# Patient Record
Sex: FEMALE | Race: WHITE | Employment: OTHER | RURAL
[De-identification: names, ages, dates, MRNs, and addresses within clinical notes are randomized per-mention and may not be internally consistent; named-entity substitution may affect disease eponyms.]

---

## 2017-12-21 ENCOUNTER — HOSPITAL ENCOUNTER (OUTPATIENT)
Dept: OTHER | Age: 42
Discharge: HOME | End: 2017-12-21

## 2017-12-21 DIAGNOSIS — R51.9 CHRONIC NONINTRACTABLE HEADACHE, UNSPECIFIED HEADACHE TYPE: ICD-10-CM

## 2017-12-21 DIAGNOSIS — G89.29 CHRONIC NONINTRACTABLE HEADACHE, UNSPECIFIED HEADACHE TYPE: ICD-10-CM

## 2018-07-16 ENCOUNTER — HOSPITAL ENCOUNTER (OUTPATIENT)
Facility: HOSPITAL | Age: 43
Discharge: HOME | End: 2018-07-16
Payer: MEDICARE

## 2018-07-16 LAB
AMPHETAMINE SCREEN, URINE: NORMAL
BARBITURATE SCREEN URINE: NORMAL
BENZODIAZEPINE SCREEN, URINE: NORMAL
CANNABINOID SCREEN URINE: NORMAL
COCAINE METABOLITE SCREEN URINE: NORMAL
Lab: NORMAL
METHADONE SCREEN, URINE: NORMAL
METHAMPHETAMINE, URINE: NORMAL
OPIATE SCREEN URINE: NORMAL
PHENCYCLIDINE SCREEN URINE: NORMAL
PROPOXYPHENE SCREEN, URINE: NORMAL
TRICYCLIC, URINE: NORMAL
UR OXYCODONE RAPID SCREEN: NORMAL

## 2018-07-16 PROCEDURE — 80307 DRUG TEST PRSMV CHEM ANLYZR: CPT

## 2018-07-19 LAB — MISCELLANEOUS LAB TEST ORDER: NORMAL

## 2019-02-21 ENCOUNTER — OFFICE VISIT (OUTPATIENT)
Dept: PRIMARY CARE CLINIC | Age: 44
End: 2019-02-21
Payer: MEDICARE

## 2019-02-21 VITALS
OXYGEN SATURATION: 95 % | HEART RATE: 104 BPM | BODY MASS INDEX: 23.74 KG/M2 | HEIGHT: 63 IN | SYSTOLIC BLOOD PRESSURE: 110 MMHG | WEIGHT: 134 LBS | DIASTOLIC BLOOD PRESSURE: 80 MMHG

## 2019-02-21 DIAGNOSIS — G89.29 CHRONIC PAIN OF RIGHT KNEE: Primary | ICD-10-CM

## 2019-02-21 DIAGNOSIS — G62.89 AXONAL NEUROPATHY: ICD-10-CM

## 2019-02-21 DIAGNOSIS — G89.29 CHRONIC LOW BACK PAIN WITHOUT SCIATICA, UNSPECIFIED BACK PAIN LATERALITY: ICD-10-CM

## 2019-02-21 DIAGNOSIS — J30.9 ALLERGIC RHINITIS, UNSPECIFIED SEASONALITY, UNSPECIFIED TRIGGER: ICD-10-CM

## 2019-02-21 DIAGNOSIS — M54.50 CHRONIC LOW BACK PAIN WITHOUT SCIATICA, UNSPECIFIED BACK PAIN LATERALITY: ICD-10-CM

## 2019-02-21 DIAGNOSIS — M25.561 CHRONIC PAIN OF RIGHT KNEE: Primary | ICD-10-CM

## 2019-02-21 DIAGNOSIS — G56.00 CARPAL TUNNEL SYNDROME, UNSPECIFIED LATERALITY: ICD-10-CM

## 2019-02-21 DIAGNOSIS — M79.7 FIBROMYALGIA: ICD-10-CM

## 2019-02-21 DIAGNOSIS — K21.9 GASTROESOPHAGEAL REFLUX DISEASE, ESOPHAGITIS PRESENCE NOT SPECIFIED: ICD-10-CM

## 2019-02-21 PROCEDURE — 99214 OFFICE O/P EST MOD 30 MIN: CPT | Performed by: NURSE PRACTITIONER

## 2019-02-21 PROCEDURE — 1036F TOBACCO NON-USER: CPT | Performed by: NURSE PRACTITIONER

## 2019-02-21 PROCEDURE — G8420 CALC BMI NORM PARAMETERS: HCPCS | Performed by: NURSE PRACTITIONER

## 2019-02-21 PROCEDURE — G8484 FLU IMMUNIZE NO ADMIN: HCPCS | Performed by: NURSE PRACTITIONER

## 2019-02-21 PROCEDURE — G8427 DOCREV CUR MEDS BY ELIG CLIN: HCPCS | Performed by: NURSE PRACTITIONER

## 2019-02-21 RX ORDER — OMEPRAZOLE 20 MG/1
20 CAPSULE, DELAYED RELEASE ORAL DAILY
Qty: 30 CAPSULE | Refills: 5 | Status: SHIPPED | OUTPATIENT
Start: 2019-02-21 | End: 2020-08-27

## 2019-02-21 RX ORDER — FLUTICASONE PROPIONATE 50 MCG
2 SPRAY, SUSPENSION (ML) NASAL DAILY
Qty: 1 BOTTLE | Refills: 5 | Status: SHIPPED | OUTPATIENT
Start: 2019-02-21 | End: 2021-07-12

## 2019-02-21 ASSESSMENT — ENCOUNTER SYMPTOMS
EYE PAIN: 0
SORE THROAT: 0
SHORTNESS OF BREATH: 0
VOMITING: 0
ABDOMINAL PAIN: 0
COUGH: 0
NAUSEA: 0

## 2019-02-21 ASSESSMENT — PATIENT HEALTH QUESTIONNAIRE - PHQ9
2. FEELING DOWN, DEPRESSED OR HOPELESS: 0
1. LITTLE INTEREST OR PLEASURE IN DOING THINGS: 0
SUM OF ALL RESPONSES TO PHQ9 QUESTIONS 1 & 2: 0
SUM OF ALL RESPONSES TO PHQ QUESTIONS 1-9: 0
SUM OF ALL RESPONSES TO PHQ QUESTIONS 1-9: 0

## 2019-02-24 ASSESSMENT — ENCOUNTER SYMPTOMS: BACK PAIN: 1

## 2019-04-05 ENCOUNTER — OFFICE VISIT (OUTPATIENT)
Dept: PRIMARY CARE CLINIC | Age: 44
End: 2019-04-05
Payer: MEDICARE

## 2019-04-05 VITALS
OXYGEN SATURATION: 100 % | SYSTOLIC BLOOD PRESSURE: 137 MMHG | DIASTOLIC BLOOD PRESSURE: 87 MMHG | WEIGHT: 134 LBS | TEMPERATURE: 97.5 F | HEART RATE: 87 BPM | BODY MASS INDEX: 23.74 KG/M2

## 2019-04-05 DIAGNOSIS — J01.10 ACUTE NON-RECURRENT FRONTAL SINUSITIS: Primary | ICD-10-CM

## 2019-04-05 PROCEDURE — 99213 OFFICE O/P EST LOW 20 MIN: CPT | Performed by: NURSE PRACTITIONER

## 2019-04-05 PROCEDURE — G8420 CALC BMI NORM PARAMETERS: HCPCS | Performed by: NURSE PRACTITIONER

## 2019-04-05 PROCEDURE — 1036F TOBACCO NON-USER: CPT | Performed by: NURSE PRACTITIONER

## 2019-04-05 PROCEDURE — G8427 DOCREV CUR MEDS BY ELIG CLIN: HCPCS | Performed by: NURSE PRACTITIONER

## 2019-04-05 RX ORDER — PREDNISONE 20 MG/1
20 TABLET ORAL DAILY
Qty: 10 TABLET | Refills: 0 | Status: SHIPPED | OUTPATIENT
Start: 2019-04-05 | End: 2019-04-10

## 2019-04-05 RX ORDER — DOXYCYCLINE HYCLATE 100 MG
100 TABLET ORAL 2 TIMES DAILY
Qty: 20 TABLET | Refills: 0 | Status: SHIPPED | OUTPATIENT
Start: 2019-04-05 | End: 2019-04-15

## 2019-04-05 ASSESSMENT — ENCOUNTER SYMPTOMS
EYES NEGATIVE: 1
WHEEZING: 1
COUGH: 1
GASTROINTESTINAL NEGATIVE: 1
SINUS PRESSURE: 1
SINUS PAIN: 1
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
## 2020-05-14 ENCOUNTER — HOSPITAL ENCOUNTER (EMERGENCY)
Facility: HOSPITAL | Age: 45
Discharge: HOME | End: 2020-05-14
Attending: EMERGENCY MEDICINE
Payer: MEDICARE

## 2020-05-14 ENCOUNTER — APPOINTMENT (OUTPATIENT)
Dept: GENERAL RADIOLOGY | Facility: HOSPITAL | Age: 45
End: 2020-05-14
Payer: MEDICARE

## 2020-05-14 ENCOUNTER — APPOINTMENT (OUTPATIENT)
Dept: ULTRASOUND IMAGING | Facility: HOSPITAL | Age: 45
End: 2020-05-14
Payer: MEDICARE

## 2020-05-14 VITALS
DIASTOLIC BLOOD PRESSURE: 75 MMHG | RESPIRATION RATE: 9 BRPM | WEIGHT: 155 LBS | SYSTOLIC BLOOD PRESSURE: 124 MMHG | HEIGHT: 63 IN | OXYGEN SATURATION: 98 % | TEMPERATURE: 97.9 F | HEART RATE: 64 BPM | BODY MASS INDEX: 27.46 KG/M2

## 2020-05-14 LAB
A/G RATIO: 1.4 (ref 0.8–2)
ALBUMIN SERPL-MCNC: 4.6 G/DL (ref 3.4–4.8)
ALP BLD-CCNC: 111 U/L (ref 25–100)
ALT SERPL-CCNC: 9 U/L (ref 4–36)
ANION GAP SERPL CALCULATED.3IONS-SCNC: 15 MMOL/L (ref 3–16)
AST SERPL-CCNC: 16 U/L (ref 8–33)
BACTERIA: ABNORMAL /HPF
BASOPHILS ABSOLUTE: 0 K/UL (ref 0–0.1)
BASOPHILS RELATIVE PERCENT: 0.3 %
BILIRUB SERPL-MCNC: 0.4 MG/DL (ref 0.3–1.2)
BILIRUBIN URINE: NEGATIVE
BLOOD, URINE: NEGATIVE
BUN BLDV-MCNC: 12 MG/DL (ref 6–20)
CALCIUM SERPL-MCNC: 9.5 MG/DL (ref 8.5–10.5)
CHLORIDE BLD-SCNC: 95 MMOL/L (ref 98–107)
CLARITY: CLEAR
CO2: 26 MMOL/L (ref 20–30)
COLOR: YELLOW
CREAT SERPL-MCNC: 0.9 MG/DL (ref 0.4–1.2)
EOSINOPHILS ABSOLUTE: 0 K/UL (ref 0–0.4)
EOSINOPHILS RELATIVE PERCENT: 0.3 %
EPITHELIAL CELLS, UA: ABNORMAL /HPF (ref 0–5)
GFR AFRICAN AMERICAN: >59
GFR NON-AFRICAN AMERICAN: >60
GLOBULIN: 3.2 G/DL
GLUCOSE BLD-MCNC: 119 MG/DL (ref 74–106)
GLUCOSE URINE: NEGATIVE MG/DL
HCT VFR BLD CALC: 53.1 % (ref 37–47)
HEMOGLOBIN: 17.8 G/DL (ref 11.5–16.5)
IMMATURE GRANULOCYTES #: 0 K/UL
IMMATURE GRANULOCYTES %: 0.3 % (ref 0–5)
KETONES, URINE: NEGATIVE MG/DL
LEUKOCYTE ESTERASE, URINE: NEGATIVE
LIPASE: 21 U/L (ref 5.6–51.3)
LYMPHOCYTES ABSOLUTE: 2.1 K/UL (ref 1.5–4)
LYMPHOCYTES RELATIVE PERCENT: 19.7 %
MCH RBC QN AUTO: 33.1 PG (ref 27–32)
MCHC RBC AUTO-ENTMCNC: 33.5 G/DL (ref 31–35)
MCV RBC AUTO: 98.7 FL (ref 80–100)
MICROSCOPIC EXAMINATION: YES
MONOCYTES ABSOLUTE: 0.5 K/UL (ref 0.2–0.8)
MONOCYTES RELATIVE PERCENT: 4.9 %
NEUTROPHILS ABSOLUTE: 8 K/UL (ref 2–7.5)
NEUTROPHILS RELATIVE PERCENT: 74.5 %
NITRITE, URINE: NEGATIVE
PDW BLD-RTO: 13.2 % (ref 11–16)
PH UA: >=9 (ref 5–8)
PLATELET # BLD: 185 K/UL (ref 150–400)
PMV BLD AUTO: 11 FL (ref 6–10)
POTASSIUM REFLEX MAGNESIUM: 4.3 MMOL/L (ref 3.4–5.1)
PROTEIN UA: 100 MG/DL
RAPID INFLUENZA  B AGN: NEGATIVE
RAPID INFLUENZA A AGN: NEGATIVE
RBC # BLD: 5.38 M/UL (ref 3.8–5.8)
RBC UA: ABNORMAL /HPF (ref 0–4)
S PYO AG THROAT QL: NEGATIVE
SODIUM BLD-SCNC: 136 MMOL/L (ref 136–145)
SPECIFIC GRAVITY UA: 1.01 (ref 1–1.03)
TOTAL PROTEIN: 7.8 G/DL (ref 6.4–8.3)
TROPONIN: <0.3 NG/ML
URINE REFLEX TO CULTURE: ABNORMAL
URINE TYPE: ABNORMAL
UROBILINOGEN, URINE: 1 E.U./DL
WBC # BLD: 10.7 K/UL (ref 4–11)
WBC UA: ABNORMAL /HPF (ref 0–5)

## 2020-05-14 PROCEDURE — 80053 COMPREHEN METABOLIC PANEL: CPT

## 2020-05-14 PROCEDURE — 99284 EMERGENCY DEPT VISIT MOD MDM: CPT

## 2020-05-14 PROCEDURE — 81001 URINALYSIS AUTO W/SCOPE: CPT

## 2020-05-14 PROCEDURE — 2500000003 HC RX 250 WO HCPCS: Performed by: EMERGENCY MEDICINE

## 2020-05-14 PROCEDURE — 2580000003 HC RX 258: Performed by: EMERGENCY MEDICINE

## 2020-05-14 PROCEDURE — 76705 ECHO EXAM OF ABDOMEN: CPT

## 2020-05-14 PROCEDURE — 96375 TX/PRO/DX INJ NEW DRUG ADDON: CPT

## 2020-05-14 PROCEDURE — 96365 THER/PROPH/DIAG IV INF INIT: CPT

## 2020-05-14 PROCEDURE — 6360000002 HC RX W HCPCS: Performed by: EMERGENCY MEDICINE

## 2020-05-14 PROCEDURE — 71045 X-RAY EXAM CHEST 1 VIEW: CPT

## 2020-05-14 PROCEDURE — 85025 COMPLETE CBC W/AUTO DIFF WBC: CPT

## 2020-05-14 PROCEDURE — U0002 COVID-19 LAB TEST NON-CDC: HCPCS

## 2020-05-14 PROCEDURE — 83690 ASSAY OF LIPASE: CPT

## 2020-05-14 PROCEDURE — 84484 ASSAY OF TROPONIN QUANT: CPT

## 2020-05-14 PROCEDURE — 87880 STREP A ASSAY W/OPTIC: CPT

## 2020-05-14 PROCEDURE — U0003 INFECTIOUS AGENT DETECTION BY NUCLEIC ACID (DNA OR RNA); SEVERE ACUTE RESPIRATORY SYNDROME CORONAVIRUS 2 (SARS-COV-2) (CORONAVIRUS DISEASE [COVID-19]), AMPLIFIED PROBE TECHNIQUE, MAKING USE OF HIGH THROUGHPUT TECHNOLOGIES AS DESCRIBED BY CMS-2020-01-R: HCPCS

## 2020-05-14 PROCEDURE — 87804 INFLUENZA ASSAY W/OPTIC: CPT

## 2020-05-14 PROCEDURE — 93005 ELECTROCARDIOGRAM TRACING: CPT

## 2020-05-14 PROCEDURE — 36415 COLL VENOUS BLD VENIPUNCTURE: CPT

## 2020-05-14 RX ORDER — CARISOPRODOL 350 MG/1
350 TABLET ORAL NIGHTLY
COMMUNITY
End: 2021-07-12

## 2020-05-14 RX ORDER — ONDANSETRON 4 MG/1
4 TABLET, FILM COATED ORAL 3 TIMES DAILY PRN
Qty: 15 TABLET | Refills: 0 | Status: SHIPPED | OUTPATIENT
Start: 2020-05-14 | End: 2021-07-12

## 2020-05-14 RX ORDER — 0.9 % SODIUM CHLORIDE 0.9 %
1000 INTRAVENOUS SOLUTION INTRAVENOUS ONCE
Status: COMPLETED | OUTPATIENT
Start: 2020-05-14 | End: 2020-05-14

## 2020-05-14 RX ORDER — PANTOPRAZOLE SODIUM 20 MG/1
20 TABLET, DELAYED RELEASE ORAL
Qty: 30 TABLET | Refills: 0 | Status: SHIPPED | OUTPATIENT
Start: 2020-05-14 | End: 2021-07-12

## 2020-05-14 RX ORDER — GABAPENTIN 800 MG/1
800 TABLET ORAL 3 TIMES DAILY
COMMUNITY

## 2020-05-14 RX ORDER — BUPRENORPHINE AND NALOXONE 8; 2 MG/1; MG/1
1 FILM, SOLUBLE BUCCAL; SUBLINGUAL 2 TIMES DAILY
Status: ON HOLD | COMMUNITY
End: 2021-07-13

## 2020-05-14 RX ADMIN — FAMOTIDINE 20 MG: 10 INJECTION INTRAVENOUS at 13:15

## 2020-05-14 RX ADMIN — Medication 25 MG: at 13:16

## 2020-05-14 RX ADMIN — SODIUM CHLORIDE 1000 ML: 9 INJECTION, SOLUTION INTRAVENOUS at 13:16

## 2020-05-14 ASSESSMENT — PAIN DESCRIPTION - ORIENTATION: ORIENTATION: MID

## 2020-05-14 ASSESSMENT — PAIN DESCRIPTION - LOCATION: LOCATION: ABDOMEN

## 2020-05-14 ASSESSMENT — PAIN DESCRIPTION - PAIN TYPE: TYPE: ACUTE PAIN

## 2020-05-14 ASSESSMENT — PAIN SCALES - GENERAL: PAINLEVEL_OUTOF10: 6

## 2020-05-14 ASSESSMENT — PAIN DESCRIPTION - DESCRIPTORS: DESCRIPTORS: CRAMPING;SPASM;TIGHTNESS

## 2020-05-14 NOTE — ACP (ADVANCE CARE PLANNING)
Advance Care Planning     Advance Care Planning Activator (Inpatient)  Conversation Note      Date of ACP Conversation: 5/14/2020    Conversation Conducted with: Patient with Decision Making Capacity    ACP Activator: Primary RN, Leida Farfan asked questions due to precautions - Izzy Matthews RN    *When Decision Maker makes decisions on behalf of the incapacitated patient: Decision Maker is asked to consider and make decisions based on patient values, known preferences, or best interests. Health Care Decision Maker:     Current Designated Health Care Decision Maker:   Primary Decision Maker: [facility] [phone]  (If there is a [address] named in the [facility] Makers\" box in the ACP activity, but it is not visible above, be sure to open that field and then select the health care decision maker relationship (ie \"primary\") in the blank space to the right of the name.) Validate  this information as still accurate & up-to-date; edit [address] field as needed.)    Note: Assess and validate information in current ACP documents, as indicated. If no Decision Maker listed above or available through scanned documents, then:    If no Authorized Decision Maker has previously been identified, then patient chooses [address]:  \"Who would you like to name as your primary health care decision-maker? \"               Name: Allison Trinh        Relationship:           Phone number: [phone]  Garcia Link this person be reached easily? \" Yes    Note: If the relationship of these Decision-Makers to the patient does NOT follow your state's Next of Kin hierarchy, recommend that patient complete ACP document that meets state-specific requirements to allow them to act on the patient's behalf when appropriate. Care Preferences    Ventilation:   \"If you were in your present state of health and suddenly became very ill and were unable to breathe on your own, what would your preference be about the use of a ventilator (breathing machine) if it were available to you? \"      Would the patient desire the use of ventilator (breathing machine)?: yes    \"If your health worsens and it becomes clear that your chance of recovery is unlikely, what would your preference be about the use of a ventilator (breathing machine) if it were available to you? \"     Would the patient desire the use of ventilator (breathing machine)?: Yes      Resuscitation  \"CPR works best to restart the heart when there is a sudden event, like a heart attack, in someone who is otherwise healthy. Unfortunately, CPR does not typically restart the heart for people who have serious health conditions or who are very sick. \"    \"In the event your heart stopped as a result of an underlying serious health condition, would you want attempts to be made to restart your heart (answer \"yes\" for attempt to resuscitate) or would you prefer a natural death (answer \"no\" for do not attempt to resuscitate)? \" yes      NOTE: If the patient has a valid advance directive AND now provides care preference(s) that are inconsistent with that prior directive, advise the patient to consider either: creating a new advance directive that complies with state-specific requirements; or, if that is not possible, orally revoking that prior directive in accordance with state-specific requirements, which must be documented in the EHR. [] Yes   [x] No   Educated Patient / Adelina Atkinsonise regarding differences between Advance Directives and portable DNR orders.     Length of ACP Conversation in minutes:      Conversation Outcomes:  [x] ACP discussion completed  [] Existing advance directive reviewed with patient; no changes to patient's previously recorded wishes  [] New Advance Directive completed  [] Portable Do Not Rescitate prepared for Provider review and signature  [] POLST/POST/MOLST/MOST prepared for Provider review and signature      Follow-up plan:
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
## 2020-05-14 NOTE — ED NOTES
Pt's emergency contact to be Karthikeyan Dankn,  [phone]. Pt wishes to be a full code status.      Edmund Gonzalez RN  05/14/20 1589

## 2020-05-15 ENCOUNTER — CARE COORDINATION (OUTPATIENT)
Dept: CARE COORDINATION | Age: 45
End: 2020-05-15

## 2020-05-15 LAB — SARS-COV-2, PCR: NOT DETECTED

## 2020-05-15 NOTE — CARE COORDINATION
JASON [facility] [address] ED:  05.14.2020  DX:  UPPER ABD PAIN; N&V    COVID RISK    Telephoned the patient. No answer. Left a voice message introducing self, reason for call, and provided number for patient to return call. Patient has MyChart. Sending patient a message with COVID-19 precautions, contact numbers, and [facility] information. Asking patient to return this Author's call.     Submitted by Gary/JUANA

## 2020-05-15 NOTE — CARE COORDINATION
sick with COVID-19 limit contact with animals until more information is known about the virus. When possible, have another member of your household care for your animals while you are sick. If you are sick with COVID-19, avoid contact with your pet, including petting, snuggling, being kissed or licked, and sharing food. If you must care for your pet or be around animals while you are sick, wash your hands before and after you interact with pets and wear a facemask. Call ahead before visiting your doctor  If you have a medical appointment, call the healthcare provider and tell them that you have or may have COVID-19. This will help the healthcare provider's office take steps to keep other people from getting infected or exposed. Wear a facemask  You should wear a facemask when you are around other people (e.g., sharing a room or vehicle) or pets and before you enter a healthcare provider's office. If you are not able to wear a facemask (for example, because it causes trouble breathing), then people who live with you should not stay in the same room with you, or they should wear a facemask if they enter your room. Cover your coughs and sneezes  Cover your mouth and nose with a tissue when you cough or sneeze. Throw used tissues in a lined trash can. Immediately wash your hands with soap and water for at least 20 seconds or, if soap and water are not available, clean your hands with an alcohol-based hand  that contains at least 60% alcohol. Clean your hands often  Wash your hands often with soap and water for at least 20 seconds, especially after blowing your nose, coughing, or sneezing; going to the bathroom; and before eating or preparing food. If soap and water are not readily available, use an alcohol-based hand  with at least 60% alcohol, covering all surfaces of your hands and rubbing them together until they feel dry. Soap and water are the best option if hands are visibly dirty. Avoid touching your eyes, nose, and mouth with unwashed hands. Avoid sharing personal household items  You should not share dishes, drinking glasses, cups, eating utensils, towels, or bedding with other people or pets in your home. After using these items, they should be washed thoroughly with soap and water. Clean all high-touch surfaces everyday  High touch surfaces include counters, tabletops, doorknobs, bathroom fixtures, toilets, phones, keyboards, tablets, and bedside tables. Also, clean any surfaces that may have blood, stool, or body fluids on them. Use a household cleaning spray or wipe, according to the label instructions. Labels contain instructions for safe and effective use of the cleaning product including precautions you should take when applying the product, such as wearing gloves and making sure you have good ventilation during use of the product. Monitor your symptoms  Seek prompt medical attention if your illness is worsening (e.g., difficulty breathing). Before seeking care, call your healthcare provider and tell them that you have, or are being evaluated for, COVID-19. Put on a facemask before you enter the facility. These steps will help the healthcare provider's office to keep other people in the office or waiting room from getting infected or exposed. Ask your healthcare provider to call the local or state health department. Persons who are placed under If you have a medical emergency and need to call 911, notify the dispatch personnel that you have, or are being evaluated for COVID-19. If possible, put on a facemask before emergency medical services arrive. The patient agrees to contact the [facility] exposure line [phone], and PCP office for questions related to their healthcare. Author provided contact information for future reference. Patient stated she has a new PCP in [place].   This author asked patient to update PCP information the next time she visits [facility]

## 2020-05-15 NOTE — TELEPHONE ENCOUNTER
Sonal - I spoke to [facility] and she has a plan for home management and what to do if her symptoms worsen. She has an appointment with her PCP on 5/20 but I will be calling her over the weekend to check on her. She know to go back to the ER if needed. Thank you    Marisa Pl[address] [phone]  [email or web address]

## 2020-05-17 ENCOUNTER — CARE COORDINATION (OUTPATIENT)
Dept: CARE COORDINATION | Age: 45
End: 2020-05-17

## 2020-05-27 ENCOUNTER — CARE COORDINATION (OUTPATIENT)
Dept: CARE COORDINATION | Age: 45
End: 2020-05-27

## 2020-05-27 NOTE — CARE COORDINATION
Your Patient resolved from the Care Transitions episode on 5/27/2020  Discussed COVID-19 related testing which was available at this time. Test results were negative. Patient informed of results, if available? Yes    Patient/family has been provided the following resources and education related to COVID-19:                         Signs, symptoms and red flags related to COVID-19            CDC exposure and quarantine guidelines            [facility] exposure contact - [phone]            Contact for their local Department of Health                 Patient currently reports that the following symptoms have improved:  no new/worsening symptoms     No further outreach scheduled with this CTN/ACM. Episode of Care resolved. Patient has this CTN/ACM contact information if future needs arise.

## 2020-08-27 ENCOUNTER — HOSPITAL ENCOUNTER (EMERGENCY)
Facility: HOSPITAL | Age: 45
Discharge: HOME | End: 2020-08-27
Attending: FAMILY MEDICINE
Payer: MEDICARE

## 2020-08-27 VITALS
HEIGHT: 63 IN | HEART RATE: 97 BPM | BODY MASS INDEX: 28.35 KG/M2 | TEMPERATURE: 98 F | SYSTOLIC BLOOD PRESSURE: 128 MMHG | WEIGHT: 160 LBS | OXYGEN SATURATION: 98 % | DIASTOLIC BLOOD PRESSURE: 83 MMHG | RESPIRATION RATE: 18 BRPM

## 2020-08-27 PROCEDURE — 6370000000 HC RX 637 (ALT 250 FOR IP): Performed by: FAMILY MEDICINE

## 2020-08-27 PROCEDURE — 99284 EMERGENCY DEPT VISIT MOD MDM: CPT

## 2020-08-27 PROCEDURE — 99283 EMERGENCY DEPT VISIT LOW MDM: CPT

## 2020-08-27 RX ORDER — SULFAMETHOXAZOLE AND TRIMETHOPRIM 800; 160 MG/1; MG/1
1 TABLET ORAL ONCE
Status: COMPLETED | OUTPATIENT
Start: 2020-08-27 | End: 2020-08-27

## 2020-08-27 RX ORDER — SULFAMETHOXAZOLE AND TRIMETHOPRIM 800; 160 MG/1; MG/1
1 TABLET ORAL 2 TIMES DAILY
Qty: 14 TABLET | Refills: 0 | Status: SHIPPED | OUTPATIENT
Start: 2020-08-27 | End: 2020-09-03

## 2020-08-27 RX ORDER — HYDROCODONE BITARTRATE AND ACETAMINOPHEN 10; 325 MG/1; MG/1
1 TABLET ORAL ONCE
Status: COMPLETED | OUTPATIENT
Start: 2020-08-27 | End: 2020-08-27

## 2020-08-27 RX ORDER — HYDROCODONE BITARTRATE AND ACETAMINOPHEN 7.5; 325 MG/1; MG/1
1 TABLET ORAL EVERY 6 HOURS PRN
Qty: 6 TABLET | Refills: 0 | Status: SHIPPED | OUTPATIENT
Start: 2020-08-27 | End: 2020-08-30

## 2020-08-27 RX ADMIN — HYDROCODONE BITARTRATE AND ACETAMINOPHEN 1 TABLET: 10; 325 TABLET ORAL at 18:48

## 2020-08-27 RX ADMIN — SULFAMETHOXAZOLE AND TRIMETHOPRIM 1 TABLET: 800; 160 TABLET ORAL at 18:48

## 2020-08-27 ASSESSMENT — PAIN SCALES - GENERAL
PAINLEVEL_OUTOF10: 10

## 2020-08-27 ASSESSMENT — PAIN DESCRIPTION - ORIENTATION
ORIENTATION: RIGHT
ORIENTATION: RIGHT

## 2020-08-27 ASSESSMENT — PAIN DESCRIPTION - LOCATION
LOCATION: HAND
LOCATION: HAND

## 2020-08-27 ASSESSMENT — ENCOUNTER SYMPTOMS: COLOR CHANGE: 1

## 2020-08-27 ASSESSMENT — PAIN DESCRIPTION - PAIN TYPE
TYPE: ACUTE PAIN
TYPE: ACUTE PAIN

## 2020-08-27 ASSESSMENT — PAIN DESCRIPTION - ONSET: ONSET: ON-GOING

## 2020-08-27 ASSESSMENT — PAIN DESCRIPTION - FREQUENCY
FREQUENCY: CONTINUOUS
FREQUENCY: CONTINUOUS

## 2020-08-27 ASSESSMENT — PAIN - FUNCTIONAL ASSESSMENT: PAIN_FUNCTIONAL_ASSESSMENT: 0-10

## 2020-08-27 ASSESSMENT — PAIN DESCRIPTION - PROGRESSION: CLINICAL_PROGRESSION: GRADUALLY WORSENING

## 2020-08-27 ASSESSMENT — PAIN DESCRIPTION - DESCRIPTORS
DESCRIPTORS: BURNING;THROBBING
DESCRIPTORS: BURNING

## 2020-08-27 NOTE — ED NOTES
Reviewed discharge plan with Yuridia Fang. Encouraged her to f/u with [facility] and she understood. NAD noted on discharge, gait steady. Reviewed discharge prescription for:     Current Discharge Medication List      START taking these medications    Details   sulfamethoxazole-trimethoprim (BACTRIM DS;SEPTRA DS) 800-160 MG per tablet Take 1 tablet by mouth 2 times daily for 7 days  Qty: 14 tablet, Refills: 0    Associated Diagnoses: Cellulitis of right upper extremity      HYDROcodone-acetaminophen (NORCO) 7.5-325 MG per tablet Take 1 tablet by mouth every 6 hours as needed for Pain for up to 3 days. Intended supply: 3 days. Take lowest dose possible to manage pain  Qty: 6 tablet, Refills: 0    Associated Diagnoses: Abscess             Elijahny Annamarie states understanding of how and when to take medications.       Electronically signed by Ash Salinas RN on 8/27/2020 at 7:14 PM     Ash Salinas RN  08/27/20 0548

## 2020-08-27 NOTE — ED TRIAGE NOTES
Patient with abscess to right hand. She states that area started about 5 days ago as a small bruise and has gotten worse over the past few days. Right hand / wrist is red / swollen. Patient having trouble bending fingers due to swelling.
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
## 2021-07-12 ENCOUNTER — APPOINTMENT (OUTPATIENT)
Dept: CT IMAGING | Facility: HOSPITAL | Age: 46
DRG: 603 | End: 2021-07-12
Payer: MEDICARE

## 2021-07-12 ENCOUNTER — HOSPITAL ENCOUNTER (INPATIENT)
Facility: HOSPITAL | Age: 46
LOS: 2 days | Discharge: ANOTHER HOSPITAL | DRG: 603 | End: 2021-07-14
Attending: EMERGENCY MEDICINE | Admitting: INTERNAL MEDICINE
Payer: MEDICARE

## 2021-07-12 DIAGNOSIS — L03.119 CELLULITIS OF FOOT: ICD-10-CM

## 2021-07-12 DIAGNOSIS — L02.619 FOOT ABSCESS: Primary | ICD-10-CM

## 2021-07-12 LAB
A/G RATIO: 0.9 (ref 0.8–2)
ALBUMIN SERPL-MCNC: 4 G/DL (ref 3.4–4.8)
ALP BLD-CCNC: 94 U/L (ref 25–100)
ALT SERPL-CCNC: 9 U/L (ref 4–36)
ANION GAP SERPL CALCULATED.3IONS-SCNC: 14 MMOL/L (ref 3–16)
AST SERPL-CCNC: 17 U/L (ref 8–33)
BASOPHILS ABSOLUTE: 0 K/UL (ref 0–0.1)
BASOPHILS RELATIVE PERCENT: 0.2 %
BILIRUB SERPL-MCNC: 0.7 MG/DL (ref 0.3–1.2)
BUN BLDV-MCNC: 10 MG/DL (ref 6–20)
C-REACTIVE PROTEIN: 333.2 MG/L (ref 0–5.1)
CALCIUM SERPL-MCNC: 9.6 MG/DL (ref 8.5–10.5)
CHLORIDE BLD-SCNC: 99 MMOL/L (ref 98–107)
CO2: 21 MMOL/L (ref 20–30)
CREAT SERPL-MCNC: 0.9 MG/DL (ref 0.4–1.2)
EOSINOPHILS ABSOLUTE: 0 K/UL (ref 0–0.4)
EOSINOPHILS RELATIVE PERCENT: 0 %
GFR AFRICAN AMERICAN: >59
GFR NON-AFRICAN AMERICAN: >60
GLOBULIN: 4.3 G/DL
GLUCOSE BLD-MCNC: 100 MG/DL (ref 74–106)
HCT VFR BLD CALC: 43.9 % (ref 37–47)
HEMOGLOBIN: 13.9 G/DL (ref 11.5–16.5)
IMMATURE GRANULOCYTES #: 0.1 K/UL
IMMATURE GRANULOCYTES %: 0.5 % (ref 0–5)
LACTIC ACID: 1.8 MMOL/L (ref 0.4–2)
LYMPHOCYTES ABSOLUTE: 3 K/UL (ref 1.5–4)
LYMPHOCYTES RELATIVE PERCENT: 16 %
MCH RBC QN AUTO: 31.4 PG (ref 27–32)
MCHC RBC AUTO-ENTMCNC: 31.7 G/DL (ref 31–35)
MCV RBC AUTO: 99.1 FL (ref 80–100)
MONOCYTES ABSOLUTE: 0.9 K/UL (ref 0.2–0.8)
MONOCYTES RELATIVE PERCENT: 4.9 %
NEUTROPHILS ABSOLUTE: 14.7 K/UL (ref 2–7.5)
NEUTROPHILS RELATIVE PERCENT: 78.4 %
PDW BLD-RTO: 13.2 % (ref 11–16)
PLATELET # BLD: 180 K/UL (ref 150–400)
PMV BLD AUTO: 11.4 FL (ref 6–10)
POTASSIUM SERPL-SCNC: 4 MMOL/L (ref 3.4–5.1)
RBC # BLD: 4.43 M/UL (ref 3.8–5.8)
SEDIMENTATION RATE, ERYTHROCYTE: 80 MM/HR (ref 0–20)
SODIUM BLD-SCNC: 134 MMOL/L (ref 136–145)
TOTAL PROTEIN: 8.3 G/DL (ref 6.4–8.3)
WBC # BLD: 18.8 K/UL (ref 4–11)

## 2021-07-12 PROCEDURE — 2500000003 HC RX 250 WO HCPCS

## 2021-07-12 PROCEDURE — 87040 BLOOD CULTURE FOR BACTERIA: CPT

## 2021-07-12 PROCEDURE — 99284 EMERGENCY DEPT VISIT MOD MDM: CPT

## 2021-07-12 PROCEDURE — 10060 I&D ABSCESS SIMPLE/SINGLE: CPT

## 2021-07-12 PROCEDURE — 83605 ASSAY OF LACTIC ACID: CPT

## 2021-07-12 PROCEDURE — 87070 CULTURE OTHR SPECIMN AEROBIC: CPT

## 2021-07-12 PROCEDURE — 6360000004 HC RX CONTRAST MEDICATION: Performed by: EMERGENCY MEDICINE

## 2021-07-12 PROCEDURE — 73702 CT LWR EXTREMITY W/O&W/DYE: CPT

## 2021-07-12 PROCEDURE — 1200000000 HC SEMI PRIVATE

## 2021-07-12 PROCEDURE — 87205 SMEAR GRAM STAIN: CPT

## 2021-07-12 PROCEDURE — 86140 C-REACTIVE PROTEIN: CPT

## 2021-07-12 PROCEDURE — 85652 RBC SED RATE AUTOMATED: CPT

## 2021-07-12 PROCEDURE — 2500000003 HC RX 250 WO HCPCS: Performed by: EMERGENCY MEDICINE

## 2021-07-12 PROCEDURE — 80053 COMPREHEN METABOLIC PANEL: CPT

## 2021-07-12 PROCEDURE — 36415 COLL VENOUS BLD VENIPUNCTURE: CPT

## 2021-07-12 PROCEDURE — 85025 COMPLETE CBC W/AUTO DIFF WBC: CPT

## 2021-07-12 PROCEDURE — 6360000002 HC RX W HCPCS: Performed by: EMERGENCY MEDICINE

## 2021-07-12 PROCEDURE — 96375 TX/PRO/DX INJ NEW DRUG ADDON: CPT

## 2021-07-12 PROCEDURE — 96365 THER/PROPH/DIAG IV INF INIT: CPT

## 2021-07-12 RX ORDER — KETAMINE HCL 50MG/ML(1)
SYRINGE (ML) INTRAVENOUS
Status: COMPLETED
Start: 2021-07-12 | End: 2021-07-12

## 2021-07-12 RX ORDER — KETAMINE HYDROCHLORIDE 50 MG/ML
50 INJECTION, SOLUTION, CONCENTRATE INTRAMUSCULAR; INTRAVENOUS ONCE
Status: DISCONTINUED | OUTPATIENT
Start: 2021-07-12 | End: 2021-07-13

## 2021-07-12 RX ORDER — LIDOCAINE HYDROCHLORIDE 10 MG/ML
5 INJECTION, SOLUTION INFILTRATION; PERINEURAL ONCE
Status: COMPLETED | OUTPATIENT
Start: 2021-07-12 | End: 2021-07-12

## 2021-07-12 RX ORDER — MORPHINE SULFATE 4 MG/ML
4 INJECTION, SOLUTION INTRAMUSCULAR; INTRAVENOUS ONCE
Status: COMPLETED | OUTPATIENT
Start: 2021-07-12 | End: 2021-07-12

## 2021-07-12 RX ORDER — CLINDAMYCIN PHOSPHATE 900 MG/50ML
900 INJECTION INTRAVENOUS ONCE
Status: COMPLETED | OUTPATIENT
Start: 2021-07-12 | End: 2021-07-12

## 2021-07-12 RX ADMIN — MORPHINE SULFATE 4 MG: 4 INJECTION, SOLUTION INTRAMUSCULAR; INTRAVENOUS at 20:26

## 2021-07-12 RX ADMIN — IOPAMIDOL 100 ML: 755 INJECTION, SOLUTION INTRAVENOUS at 21:35

## 2021-07-12 RX ADMIN — Medication 50 MG: at 22:02

## 2021-07-12 RX ADMIN — CLINDAMYCIN IN 5 PERCENT DEXTROSE 900 MG: 18 INJECTION, SOLUTION INTRAVENOUS at 20:27

## 2021-07-12 RX ADMIN — LIDOCAINE HYDROCHLORIDE 5 ML: 10 INJECTION, SOLUTION INFILTRATION; PERINEURAL at 21:55

## 2021-07-12 ASSESSMENT — PAIN DESCRIPTION - FREQUENCY: FREQUENCY: CONTINUOUS

## 2021-07-12 ASSESSMENT — PAIN SCALES - GENERAL
PAINLEVEL_OUTOF10: 10

## 2021-07-12 ASSESSMENT — PAIN DESCRIPTION - LOCATION: LOCATION: FOOT

## 2021-07-12 ASSESSMENT — PAIN DESCRIPTION - PAIN TYPE: TYPE: ACUTE PAIN

## 2021-07-12 ASSESSMENT — PAIN DESCRIPTION - DESCRIPTORS: DESCRIPTORS: ACHING

## 2021-07-12 ASSESSMENT — PAIN DESCRIPTION - ORIENTATION: ORIENTATION: RIGHT
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
## 2021-07-12 NOTE — ED NOTES
Patient cut her right foot on a piece of tin about 2 weeks ago, patient now with infection and abscess to right foot.       Racheal Marley RN  07/12/21 2975

## 2021-07-13 ENCOUNTER — HOSPITAL ENCOUNTER (INPATIENT)
Dept: HOSPITAL 79 - M/S | Age: 46
LOS: 9 days | Discharge: HOME | DRG: 264 | End: 2021-07-22
Attending: INTERNAL MEDICINE | Admitting: INTERNAL MEDICINE
Payer: MEDICARE

## 2021-07-13 ENCOUNTER — APPOINTMENT (OUTPATIENT)
Dept: MRI IMAGING | Facility: HOSPITAL | Age: 46
DRG: 603 | End: 2021-07-13
Payer: MEDICARE

## 2021-07-13 VITALS — HEIGHT: 63 IN | BODY MASS INDEX: 23.1 KG/M2 | WEIGHT: 130.37 LBS

## 2021-07-13 DIAGNOSIS — E11.69: ICD-10-CM

## 2021-07-13 DIAGNOSIS — Z79.4: ICD-10-CM

## 2021-07-13 DIAGNOSIS — F19.10: ICD-10-CM

## 2021-07-13 DIAGNOSIS — F41.9: ICD-10-CM

## 2021-07-13 DIAGNOSIS — Z20.822: ICD-10-CM

## 2021-07-13 DIAGNOSIS — N17.9: ICD-10-CM

## 2021-07-13 DIAGNOSIS — E11.52: Primary | ICD-10-CM

## 2021-07-13 DIAGNOSIS — L02.415: ICD-10-CM

## 2021-07-13 DIAGNOSIS — E55.9: ICD-10-CM

## 2021-07-13 DIAGNOSIS — B95.4: ICD-10-CM

## 2021-07-13 DIAGNOSIS — I82.401: ICD-10-CM

## 2021-07-13 DIAGNOSIS — F17.210: ICD-10-CM

## 2021-07-13 DIAGNOSIS — F11.20: ICD-10-CM

## 2021-07-13 DIAGNOSIS — L03.115: ICD-10-CM

## 2021-07-13 DIAGNOSIS — Z83.3: ICD-10-CM

## 2021-07-13 DIAGNOSIS — M86.8X7: ICD-10-CM

## 2021-07-13 DIAGNOSIS — Z90.710: ICD-10-CM

## 2021-07-13 PROBLEM — M79.7 FIBROMYALGIA: Status: ACTIVE | Noted: 2021-07-13

## 2021-07-13 PROBLEM — F19.90 IVDU (INTRAVENOUS DRUG USER): Status: ACTIVE | Noted: 2021-07-13

## 2021-07-13 LAB
A/G RATIO: 1 (ref 0.8–2)
ALBUMIN SERPL-MCNC: 3.4 G/DL (ref 3.4–4.8)
ALP BLD-CCNC: 92 U/L (ref 25–100)
ALT SERPL-CCNC: 12 U/L (ref 4–36)
AMPHETAMINE SCREEN, URINE: POSITIVE
ANION GAP SERPL CALCULATED.3IONS-SCNC: 10 MMOL/L (ref 3–16)
AST SERPL-CCNC: 27 U/L (ref 8–33)
BARBITURATE SCREEN URINE: ABNORMAL
BENZODIAZEPINE SCREEN, URINE: ABNORMAL
BILIRUB SERPL-MCNC: 0.5 MG/DL (ref 0.3–1.2)
BUN BLDV-MCNC: 11 MG/DL (ref 6–20)
BUPRENORPHINE QUAL, URINE: ABNORMAL
CALCIUM SERPL-MCNC: 8.7 MG/DL (ref 8.5–10.5)
CANNABINOID SCREEN URINE: POSITIVE
CHLORIDE BLD-SCNC: 102 MMOL/L (ref 98–107)
CO2: 24 MMOL/L (ref 20–30)
COCAINE METABOLITE SCREEN URINE: ABNORMAL
CREAT SERPL-MCNC: 0.7 MG/DL (ref 0.4–1.2)
GFR AFRICAN AMERICAN: >59
GFR NON-AFRICAN AMERICAN: >60
GLOBULIN: 3.4 G/DL
GLUCOSE BLD-MCNC: 150 MG/DL (ref 74–106)
HCT VFR BLD CALC: 36.4 % (ref 37–47)
HEMOGLOBIN: 11.8 G/DL (ref 11.5–16.5)
Lab: ABNORMAL
MCH RBC QN AUTO: 31.6 PG (ref 27–32)
MCHC RBC AUTO-ENTMCNC: 32.4 G/DL (ref 31–35)
MCV RBC AUTO: 97.3 FL (ref 80–100)
METHADONE SCREEN, URINE: ABNORMAL
METHAMPHETAMINE, URINE: POSITIVE
OPIATE SCREEN URINE: POSITIVE
PDW BLD-RTO: 13.2 % (ref 11–16)
PHENCYCLIDINE SCREEN URINE: ABNORMAL
PLATELET # BLD: 187 K/UL (ref 150–400)
PMV BLD AUTO: 11.8 FL (ref 6–10)
POTASSIUM REFLEX MAGNESIUM: 3.9 MMOL/L (ref 3.4–5.1)
PROPOXYPHENE SCREEN, URINE: ABNORMAL
RBC # BLD: 3.74 M/UL (ref 3.8–5.8)
SARS-COV-2, NAAT: NOT DETECTED
SODIUM BLD-SCNC: 136 MMOL/L (ref 136–145)
TOTAL PROTEIN: 6.8 G/DL (ref 6.4–8.3)
TRICYCLIC, URINE: ABNORMAL
UR OXYCODONE RAPID SCREEN: ABNORMAL
WBC # BLD: 16.1 K/UL (ref 4–11)

## 2021-07-13 PROCEDURE — 87205 SMEAR GRAM STAIN: CPT

## 2021-07-13 PROCEDURE — U0002 COVID-19 LAB TEST NON-CDC: HCPCS

## 2021-07-13 PROCEDURE — 99223 1ST HOSP IP/OBS HIGH 75: CPT | Performed by: INTERNAL MEDICINE

## 2021-07-13 PROCEDURE — 87390 HIV-1 AG IA: CPT

## 2021-07-13 PROCEDURE — 85027 COMPLETE CBC AUTOMATED: CPT

## 2021-07-13 PROCEDURE — 87077 CULTURE AEROBIC IDENTIFY: CPT

## 2021-07-13 PROCEDURE — 6370000000 HC RX 637 (ALT 250 FOR IP)

## 2021-07-13 PROCEDURE — 97116 GAIT TRAINING THERAPY: CPT

## 2021-07-13 PROCEDURE — 87186 SC STD MICRODIL/AGAR DIL: CPT

## 2021-07-13 PROCEDURE — 97165 OT EVAL LOW COMPLEX 30 MIN: CPT

## 2021-07-13 PROCEDURE — 80074 ACUTE HEPATITIS PANEL: CPT

## 2021-07-13 PROCEDURE — 87635 SARS-COV-2 COVID-19 AMP PRB: CPT

## 2021-07-13 PROCEDURE — 73718 MRI LOWER EXTREMITY W/O DYE: CPT

## 2021-07-13 PROCEDURE — 6370000000 HC RX 637 (ALT 250 FOR IP): Performed by: PHYSICIAN ASSISTANT

## 2021-07-13 PROCEDURE — 36415 COLL VENOUS BLD VENIPUNCTURE: CPT

## 2021-07-13 PROCEDURE — 87070 CULTURE OTHR SPECIMN AEROBIC: CPT

## 2021-07-13 PROCEDURE — 97161 PT EVAL LOW COMPLEX 20 MIN: CPT

## 2021-07-13 PROCEDURE — 2580000003 HC RX 258: Performed by: PHYSICIAN ASSISTANT

## 2021-07-13 PROCEDURE — 6360000002 HC RX W HCPCS: Performed by: INTERNAL MEDICINE

## 2021-07-13 PROCEDURE — 1200000000 HC SEMI PRIVATE

## 2021-07-13 PROCEDURE — 86702 HIV-2 ANTIBODY: CPT

## 2021-07-13 PROCEDURE — 80307 DRUG TEST PRSMV CHEM ANLYZR: CPT

## 2021-07-13 PROCEDURE — 87075 CULTR BACTERIA EXCEPT BLOOD: CPT

## 2021-07-13 PROCEDURE — 80053 COMPREHEN METABOLIC PANEL: CPT

## 2021-07-13 PROCEDURE — 6360000002 HC RX W HCPCS: Performed by: PHYSICIAN ASSISTANT

## 2021-07-13 PROCEDURE — 86701 HIV-1ANTIBODY: CPT

## 2021-07-13 PROCEDURE — 2580000003 HC RX 258: Performed by: INTERNAL MEDICINE

## 2021-07-13 RX ORDER — OXYCODONE HYDROCHLORIDE AND ACETAMINOPHEN 5; 325 MG/1; MG/1
1 TABLET ORAL EVERY 4 HOURS PRN
Status: DISCONTINUED | OUTPATIENT
Start: 2021-07-13 | End: 2021-07-14 | Stop reason: HOSPADM

## 2021-07-13 RX ORDER — ONDANSETRON 4 MG/1
4 TABLET, ORALLY DISINTEGRATING ORAL EVERY 8 HOURS PRN
Status: DISCONTINUED | OUTPATIENT
Start: 2021-07-13 | End: 2021-07-14 | Stop reason: HOSPADM

## 2021-07-13 RX ORDER — NICOTINE 21 MG/24HR
1 PATCH, TRANSDERMAL 24 HOURS TRANSDERMAL DAILY
Status: DISCONTINUED | OUTPATIENT
Start: 2021-07-13 | End: 2021-07-14 | Stop reason: HOSPADM

## 2021-07-13 RX ORDER — FAMOTIDINE 20 MG/1
20 TABLET, FILM COATED ORAL NIGHTLY
Status: DISCONTINUED | OUTPATIENT
Start: 2021-07-13 | End: 2021-07-14 | Stop reason: HOSPADM

## 2021-07-13 RX ORDER — MORPHINE SULFATE 4 MG/ML
4 INJECTION, SOLUTION INTRAMUSCULAR; INTRAVENOUS EVERY 4 HOURS PRN
Status: DISCONTINUED | OUTPATIENT
Start: 2021-07-13 | End: 2021-07-14 | Stop reason: HOSPADM

## 2021-07-13 RX ORDER — NICOTINE 21 MG/24HR
PATCH, TRANSDERMAL 24 HOURS TRANSDERMAL
Status: DISCONTINUED
Start: 2021-07-13 | End: 2021-07-14 | Stop reason: HOSPADM

## 2021-07-13 RX ORDER — VANCOMYCIN HYDROCHLORIDE 750 MG/15ML
INJECTION, POWDER, LYOPHILIZED, FOR SOLUTION INTRAVENOUS
Status: DISCONTINUED
Start: 2021-07-13 | End: 2021-07-13 | Stop reason: ALTCHOICE

## 2021-07-13 RX ORDER — VANCOMYCIN HYDROCHLORIDE 500 MG/10ML
INJECTION, POWDER, LYOPHILIZED, FOR SOLUTION INTRAVENOUS
Status: DISCONTINUED
Start: 2021-07-13 | End: 2021-07-13 | Stop reason: ALTCHOICE

## 2021-07-13 RX ORDER — GABAPENTIN 400 MG/1
800 CAPSULE ORAL 2 TIMES DAILY
Status: DISCONTINUED | OUTPATIENT
Start: 2021-07-13 | End: 2021-07-14 | Stop reason: HOSPADM

## 2021-07-13 RX ORDER — ONDANSETRON 2 MG/ML
4 INJECTION INTRAMUSCULAR; INTRAVENOUS EVERY 6 HOURS PRN
Status: DISCONTINUED | OUTPATIENT
Start: 2021-07-13 | End: 2021-07-14 | Stop reason: HOSPADM

## 2021-07-13 RX ORDER — BUPRENORPHINE HYDROCHLORIDE AND NALOXONE HYDROCHLORIDE DIHYDRATE 8; 2 MG/1; MG/1
2 TABLET SUBLINGUAL DAILY
COMMUNITY

## 2021-07-13 RX ORDER — SODIUM CHLORIDE 9 MG/ML
INJECTION, SOLUTION INTRAVENOUS CONTINUOUS
Status: ACTIVE | OUTPATIENT
Start: 2021-07-13 | End: 2021-07-13

## 2021-07-13 RX ORDER — POLYETHYLENE GLYCOL 3350 17 G/17G
17 POWDER, FOR SOLUTION ORAL DAILY PRN
Status: DISCONTINUED | OUTPATIENT
Start: 2021-07-13 | End: 2021-07-14 | Stop reason: HOSPADM

## 2021-07-13 RX ORDER — FAMOTIDINE 20 MG/1
20 TABLET, FILM COATED ORAL NIGHTLY
COMMUNITY

## 2021-07-13 RX ORDER — NICOTINE 21 MG/24HR
1 PATCH, TRANSDERMAL 24 HOURS TRANSDERMAL DAILY
Status: DISCONTINUED | OUTPATIENT
Start: 2021-07-13 | End: 2021-07-13

## 2021-07-13 RX ORDER — ACETAMINOPHEN 650 MG/1
650 SUPPOSITORY RECTAL EVERY 6 HOURS PRN
Status: DISCONTINUED | OUTPATIENT
Start: 2021-07-13 | End: 2021-07-14 | Stop reason: HOSPADM

## 2021-07-13 RX ORDER — ACETAMINOPHEN 325 MG/1
650 TABLET ORAL EVERY 6 HOURS PRN
Status: DISCONTINUED | OUTPATIENT
Start: 2021-07-13 | End: 2021-07-14 | Stop reason: HOSPADM

## 2021-07-13 RX ADMIN — GABAPENTIN 800 MG: 400 CAPSULE ORAL at 21:49

## 2021-07-13 RX ADMIN — POLYETHYLENE GLYCOL 3350 17 G: 17 POWDER, FOR SOLUTION ORAL at 08:45

## 2021-07-13 RX ADMIN — GABAPENTIN 800 MG: 400 CAPSULE ORAL at 01:04

## 2021-07-13 RX ADMIN — PIPERACILLIN AND TAZOBACTAM 3375 MG: 3; .375 INJECTION, POWDER, FOR SOLUTION INTRAVENOUS at 17:22

## 2021-07-13 RX ADMIN — MORPHINE SULFATE 4 MG: 4 INJECTION, SOLUTION INTRAMUSCULAR; INTRAVENOUS at 08:45

## 2021-07-13 RX ADMIN — MORPHINE SULFATE 4 MG: 4 INJECTION, SOLUTION INTRAMUSCULAR; INTRAVENOUS at 12:45

## 2021-07-13 RX ADMIN — ENOXAPARIN SODIUM 40 MG: 40 INJECTION SUBCUTANEOUS at 08:45

## 2021-07-13 RX ADMIN — MORPHINE SULFATE 4 MG: 4 INJECTION, SOLUTION INTRAMUSCULAR; INTRAVENOUS at 16:44

## 2021-07-13 RX ADMIN — OXYCODONE AND ACETAMINOPHEN 1 TABLET: 5; 325 TABLET ORAL at 21:50

## 2021-07-13 RX ADMIN — OXYCODONE AND ACETAMINOPHEN 1 TABLET: 5; 325 TABLET ORAL at 17:22

## 2021-07-13 RX ADMIN — SODIUM CHLORIDE: 9 INJECTION, SOLUTION INTRAVENOUS at 01:04

## 2021-07-13 RX ADMIN — GABAPENTIN 800 MG: 400 CAPSULE ORAL at 08:45

## 2021-07-13 RX ADMIN — MORPHINE SULFATE 4 MG: 4 INJECTION, SOLUTION INTRAMUSCULAR; INTRAVENOUS at 23:23

## 2021-07-13 RX ADMIN — VANCOMYCIN 1250 MG: 1 INJECTION, SOLUTION INTRAVENOUS at 02:27

## 2021-07-13 RX ADMIN — OXYCODONE AND ACETAMINOPHEN 1 TABLET: 5; 325 TABLET ORAL at 01:05

## 2021-07-13 RX ADMIN — OXYCODONE AND ACETAMINOPHEN 1 TABLET: 5; 325 TABLET ORAL at 13:16

## 2021-07-13 RX ADMIN — VANCOMYCIN HYDROCHLORIDE 1000 MG: 1 INJECTION, POWDER, LYOPHILIZED, FOR SOLUTION INTRAVENOUS at 21:48

## 2021-07-13 RX ADMIN — FAMOTIDINE 20 MG: 20 TABLET, FILM COATED ORAL at 21:50

## 2021-07-13 RX ADMIN — OXYCODONE AND ACETAMINOPHEN 1 TABLET: 5; 325 TABLET ORAL at 09:16

## 2021-07-13 RX ADMIN — PIPERACILLIN AND TAZOBACTAM 3375 MG: 3; .375 INJECTION, POWDER, FOR SOLUTION INTRAVENOUS at 10:07

## 2021-07-13 RX ADMIN — PIPERACILLIN AND TAZOBACTAM 3375 MG: 3; .375 INJECTION, POWDER, FOR SOLUTION INTRAVENOUS at 01:04

## 2021-07-13 ASSESSMENT — PAIN SCALES - GENERAL
PAINLEVEL_OUTOF10: 9
PAINLEVEL_OUTOF10: 8
PAINLEVEL_OUTOF10: 9
PAINLEVEL_OUTOF10: 4
PAINLEVEL_OUTOF10: 9
PAINLEVEL_OUTOF10: 8
PAINLEVEL_OUTOF10: 9
PAINLEVEL_OUTOF10: 7

## 2021-07-13 ASSESSMENT — PAIN DESCRIPTION - FREQUENCY
FREQUENCY: CONTINUOUS

## 2021-07-13 ASSESSMENT — PAIN DESCRIPTION - PAIN TYPE
TYPE: ACUTE PAIN

## 2021-07-13 ASSESSMENT — PAIN DESCRIPTION - LOCATION
LOCATION: FOOT

## 2021-07-13 ASSESSMENT — PAIN DESCRIPTION - ORIENTATION
ORIENTATION: RIGHT

## 2021-07-13 ASSESSMENT — PAIN DESCRIPTION - DESCRIPTORS
DESCRIPTORS: ACHING

## 2021-07-13 NOTE — PROGRESS NOTES
Physical Therapy    Facility/Department: [facility] CHILDREN MED SURG  Initial Assessment    NAME: Tracy Figueroa  : 1975  MRN: [id number]    Date of Service: 2021    Discharge Recommendations:  Home with assist PRN        Assessment   Body structures, Functions, Activity limitations: Decreased high-level IADLs; Increased pain  Assessment: Right foot ulcer; recommend RW for safe ambulation and to keep weight off of right foot; she demonstrated safe ambulation with walker and was able to keep weight off of right foot  Treatment Diagnosis: Right foot ulcer;  Prognosis: Excellent  Decision Making: Low Complexity  REQUIRES PT FOLLOW UP: No  Activity Tolerance  Activity Tolerance: Patient Tolerated treatment well       Patient Diagnosis(es): The primary encounter diagnosis was Foot abscess. A diagnosis of Cellulitis of foot was also pertinent to this visit. has a past medical history of Anxiety, Anxiety and depression, Arthralgia, Bulging disc, Carpal tunnel syndrome, Compression fracture, DDD (degenerative disc disease), lumbar, HA (headache), Nerve damage, Spinal stenosis, and Vitamin D deficiency. has a past surgical history that includes Hysterectomy (2010).     Restrictions  Restrictions/Precautions  Restrictions/Precautions: General Precautions, Weight Bearing  Required Braces or Orthoses?: No  Lower Extremity Weight Bearing Restrictions  Right Lower Extremity Weight Bearing: Toe Touch Weight Bearing  Vision/Hearing  Vision: Impaired  Vision Exceptions: Wears glasses for reading  Hearing: Within functional limits     Subjective  General  Chart Reviewed: Yes  Patient assessed for rehabilitation services?: Yes  Response To Previous Treatment: Not applicable  Family / Caregiver Present: No  Referring Practitioner: Baron Ananda MD  Diagnosis: Right foot ulcer  Follows Commands: Within Functional Limits  Pain Screening  Patient Currently in Pain: Yes  Pain Assessment  Pain Assessment: 0-10  Pain Level: 7  Pain Type: Acute pain  Pain Location: Foot  Pain Orientation: Right  Pain Frequency: Continuous  Vital Signs  Patient Currently in Pain: Yes       Orientation  Orientation  Overall Orientation Status: Within Normal Limits  Social/Functional History  Social/Functional History  Lives With: Spouse  Type of Home: Apartment  Home Layout: One level  Home Access: Stairs to enter with rails  Entrance Stairs - Number of Steps: 15  Bathroom Shower/Tub: Tub/Shower unit  Bathroom Toilet: Standard  Bathroom Accessibility: Accessible  ADL Assistance: Independent  Homemaking Assistance: Independent  Homemaking Responsibilities: Yes  Ambulation Assistance: Independent  Transfer Assistance: Independent  Active : No  Cognition        Objective     Observation/Palpation  Posture: Good  Observation: patient sitting up in bed, NAD, pleasant and cooperative; right foot with bandage intact, mild drainage visible    AROM RLE (degrees)  RLE AROM: WFL  AROM LLE (degrees)  LLE AROM : WFL  AROM RUE (degrees)  RUE AROM : WFL  AROM LUE (degrees)  LUE AROM : WFL  Strength RLE  Strength RLE: WFL  Strength LLE  Strength LLE: WFL  Strength RUE  Strength RUE: WFL  Strength LUE  Strength LUE: WFL  Tone RLE  RLE Tone: Normotonic  Tone LLE  LLE Tone: Normotonic  Motor Control  Gross Motor?: WFL  Sensation  Overall Sensation Status: WFL  Bed mobility  Rolling to Left: Independent  Supine to Sit: Independent  Sit to Supine: Independent  Scooting: Independent  Transfers  Sit to Stand: Modified independent  Stand to sit: Modified independent  Bed to Chair: Modified independent  Ambulation  Ambulation?: Yes  Ambulation 1  Surface: level tile  Device: Rolling Walker  Assistance: Supervision  Gait Deviations: Slow Janae;Decreased step length;Decreased step height  Distance: 10 feet x 2 with RW - TTWB --> actual feather weight-bearing on heel - to non-wbing     Balance  Posture: Good  Sitting - Static: Good  Sitting - Dynamic: Good  Standing - Static: Good  Standing - Dynamic: Good;-        Plan   Plan  Times per week: 1 visit  Plan weeks: 1 visit  Current Treatment Recommendations: Gait Training  Plan Comment: Will f/u prn      Goals  Short term goals  Time Frame for Short term goals: 1 visit  Short term goal 1: Patient to demonstrate safe ambulation with RW for short distances.        Therapy Time   Individual Concurrent Group Co-treatment   Time In           Time Out           Minutes                   Anisa Pulse, PT

## 2021-07-13 NOTE — PLAN OF CARE
Problem: Pain:  Goal: Pain level will decrease  Description: Pain level will decrease  Outcome: Ongoing     Problem: Falls - Risk of:  Goal: Will remain free from falls  Description: Will remain free from falls  7/13/2021 0947 by Ada Gilbert RN  Outcome: Ongoing  7/13/2021 0030 by Yaa Mari LPN  Outcome: Ongoing

## 2021-07-13 NOTE — PROGRESS NOTES
I have reviewed the medication list with the patient and compared it to the list from Janny Bello and made the following changes:    Changed:  -Buprenorphine 8mg/2mg tablet from 1 bid to 2 qd    Deleted:  -none    Added:  -Famotidine 20mg tablet 1 hs     Of note, Carisoprodol, Diclofenac Gel, Zofran, Protonix, and Flonase were already deleted prior to my review.     Melisa Monzon and Declan Headley, PharmD Candidates

## 2021-07-13 NOTE — FLOWSHEET NOTE
(comment)  (kerlix)   Wound Assessment Ruptured blister;Pink/red;Erythema;Bleeding; Other (Comment)  (serosanginous drainage)   Drainage Amount Moderate   Drainage Description Purulent;Serosanguinous; Yellow; Thin   Odor Moderate   Psychosocial   Psychosocial (WDL) WDL   Pt.  Alert times 4 Pt able to take am medications well per [facility]- pt given pain medication for pain - Right foot warm to touch pulse positive- Wound culture sent this am - Dressing re applied Pt told to call out with any needs Will monitor

## 2021-07-13 NOTE — CONSULTS
Pharmacy Note  Vancomycin Consult    Renato Stevenson is a 55 y.o. female started on Vancomycin for gram positive coverage of right foot abscess; consult received from JAYLIN Castle to manage therapy. Also receiving the following antibiotics: Zosyn. Patient Active Problem List   Diagnosis    Arthralgia    Vitamin D deficiency    Knee pain, bilateral    Muscle spasm of back    Chronic low back pain    Anxiety and depression    Cellulitis and abscess of foot     Allergies:  Patient has no known allergies. Temp max: 99.1    Recent Labs     07/12/21  1926   BUN 10   CREATININE 0.9   WBC 18.8*       Intake/Output Summary (Last 24 hours) at 7/13/2021 0314  Last data filed at 7/13/2021 0106  Gross per 24 hour   Intake 237 ml   Output 400 ml   Net -163 ml     Culture Date      Source                       Results      Ht Readings from Last 1 Encounters:   07/13/21 5' 3\" (1.6 m)        Wt Readings from Last 1 Encounters:   07/13/21 139 lb 1 oz (63.1 kg)       Body mass index is 24.63 kg/m². Estimated Creatinine Clearance: 70 mL/min (based on SCr of 0.9 mg/dL). Goal AUC Level: 400 - 600    Assessment/Plan:  Will initiate Vancomycin with a one time loading dose of 1250 mg x1, followed by 1000 mg IV every 18 hours. A vancomycin level has been ordered for 7/14 @ 1400. Expected level per [facility] = 8.9 mcg/ml    [facility] summary:  Loading dose: 1250 mg at 02:27 07/13/2021. Regimen: 1000 mg IV every 18 hours. Start time: 08:27 on 07/15/2021  Exposure target: AUC24 (range)400-600 mg/L.hr   AUC24,ss: 417 mg/L.hr  Probability of AUC24 > 400: 54 %  Ctrough,ss: 11.8 mg/L  Probability of Ctrough,ss > 20: 14 %  Probability of nephrotoxicity (Lodise TANK 2009): 7 %      Thank you for the consult. Will continue to follow.

## 2021-07-13 NOTE — CARE COORDINATION
Called and spoke with Jose at the [facility] who stated they would call us back with the hospitalist on call.      BW

## 2021-07-13 NOTE — FLOWSHEET NOTE
07/13/21 0842   Vital Signs   Temp 97.3 °F (36.3 °C)   Temp Source Oral   Pulse 79   Heart Rate Source Monitor   Resp 18   /82   BP Location Left upper arm   MAP (mmHg) 92   Oxygen Therapy   SpO2 100 %   O2 Device None (Room air)

## 2021-07-13 NOTE — ED NOTES
Administered Isovue 370 per radiology protocol via patent hep lock established in ER. Pt tolerated the exam with no obvious difficulties and was d/c'd back to OR with no obvious reaction to the contrast.  ER RN notified of contrast administration.
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
## 2021-07-13 NOTE — PROGRESS NOTES
Gucci Ennis from [id number] [facility] -No beds at this time updated every 4 hours- [phone]

## 2021-07-13 NOTE — FLOWSHEET NOTE
07/13/21 0036   Assessment   Charting Type Admission   Neurological   Neuro (WDL) WDL   Level of Consciousness Alert (0)   [place] Coma Scale   Eye Opening 4   Best Verbal Response 5   Best Motor Response 6   [place] Coma Scale Score 15   HEENT   HEENT (WDL) X   Teeth Dentures upper;Dentures lower   Respiratory   Respiratory (WDL) WDL   Cardiac   Cardiac (WDL) WDL   Gastrointestinal   Abdominal (WDL) X   Abdomen Inspection Soft;Rounded   Last BM (including prior to admit) 07/09/21   Tenderness Soft;Nontender   RUQ Bowel Sounds Hypoactive   LUQ Bowel Sounds Hypoactive   RLQ Bowel Sounds Hypoactive   LLQ Bowel Sounds Hypoactive   Peripheral Vascular   Peripheral Vascular (WDL) WDL   Edema Right lower extremity   RLE Edema Pitting;+2   Skin Color/Condition   Skin Color/Condition (WDL) WDL   Skin Color Appropriate for ethnicity   Skin Condition/Temp Warm;Dry   Skin Integrity   Skin Integrity (WDL) X   Skin Integrity Other (Comment)  (wound)   Location right foot   Preventative Dressing Yes   Multiple Skin Integrity Sites No   Dressing Site   (right foot)   Date Applied 07/13/21   Musculoskeletal   Musculoskeletal (WDL) X   RUE Full movement   LUE Full movement   RL Extremity Limited movement;Swelling   LL Extremity Full movement   Genitourinary   Genitourinary (WDL) WDL   Wound 07/12/21 Foot Right   Date First Assessed/Time First Assessed: 07/12/21 1845   Primary Wound Type: Traumatic  Location: Foot  Wound Location Orientation: Right   Wound Image    Wound Etiology Traumatic   Dressing Status New dressing applied   Wound Cleansed Irrigated with saline   Dressing/Treatment Non adherent; Other (comment)  (kerlix)   Wound Assessment Ruptured blister;Pink/red;Erythema;Bleeding; Other (Comment)  (serosanginous drainage)   Drainage Amount Moderate   Drainage Description Purulent;Serosanguinous; Yellow; Thin   Odor Moderate   Psychosocial   Psychosocial (WDL) WDL     Patient arrived via stretcher to room 14.  Alert and oriented x4. Lung sounds CTA, patient continues on RA. No acute distress noted. Swelling, redness, and wound noted to right foot. Dressing applied. Admission assessment complete at this time. Educated patient on using call bell for assistance, patient verbalizes understanding. Call bell and bedside table within reach. Bed in lowest position. Will continue to monitor.

## 2021-07-13 NOTE — ED NOTES
Patients right foot with large amount of redness and swelling noted.      Morgan Snell RN  07/12/21 2124

## 2021-07-13 NOTE — ED NOTES
Report called to Janus Severs, medical unit RN at this time.         Joesph Castro RN  07/13/21 4002

## 2021-07-13 NOTE — PROGRESS NOTES
Peer Recovery Support Note    Name: Anamaria Hayes  Date: 7/13/2021    Chief Complaint   Patient presents with    Abscess       Peer Support met with patient. [x] Support and education provided  [x] Resources provided   [] Treatment referral:   [] Other:   [] Patient declined peer recovery services     Referred By: JAYLIN Montana     Notes:   Pt. Was sitting up in the bed when we walked in. She stated that she had been in recovery for 2 years and has been in the [facility]. She said \"it was just a one time thing\" referring to her relapse. She became tearful talking about it and the stress that she had been under. We offered support and told her that we could get her an appointment with a therapist for counseling. She was agreeable with that. She does have support from her  and says Gilbert Dye never wants to use again\"  We encouraged her to not be so hard on herself and offered some of our experiences with relapse. We provided information about an AA meeting in [facility] and gave her my contact information in case she needs anything. Will follow up.   Intervention included REX Jon as well    Signed: Zaria Colbert, 7/13/2021

## 2021-07-14 VITALS
RESPIRATION RATE: 16 BRPM | SYSTOLIC BLOOD PRESSURE: 107 MMHG | HEIGHT: 63 IN | WEIGHT: 139.06 LBS | TEMPERATURE: 98.6 F | DIASTOLIC BLOOD PRESSURE: 77 MMHG | BODY MASS INDEX: 24.64 KG/M2 | HEART RATE: 77 BPM | OXYGEN SATURATION: 100 %

## 2021-07-14 LAB
BUN/CREATININE RATIO: 10 (ref 0–10)
HAV IGM SER IA-ACNC: NORMAL
HEPATITIS B CORE IGM ANTIBODY: NORMAL
HEPATITIS B SURFACE ANTIGEN INTERPRETATION: NORMAL
HEPATITIS C ANTIBODY INTERPRETATION: NORMAL
HGB BLD-MCNC: 13.2 GM/DL (ref 12.3–15.3)
RED BLOOD COUNT: 4.04 M/UL (ref 4–5.1)
WHITE BLOOD COUNT: 6.3 K/UL (ref 4.5–11)

## 2021-07-14 PROCEDURE — 0J9Q0ZZ DRAINAGE OF RIGHT FOOT SUBCUTANEOUS TISSUE AND FASCIA, OPEN APPROACH: ICD-10-PCS | Performed by: PODIATRIST

## 2021-07-14 PROCEDURE — 0JBQ0ZZ EXCISION OF RIGHT FOOT SUBCUTANEOUS TISSUE AND FASCIA, OPEN APPROACH: ICD-10-PCS | Performed by: PODIATRIST

## 2021-07-14 NOTE — PROGRESS NOTES
Patient has bed at [facility], going to 5th floor med surg. Report given to [facility]. Provider notified. Awaiting ems transport at this time.

## 2021-07-14 NOTE — DISCHARGE SUMMARY
Discharge Summary      Patient ID: Ramón Deras      Patient's PCP: Yakov Haddad    Admit Date: 7/12/2021     Discharge Date: 7/14/2021     Admitting Provider: Joy Card MD    Discharging Provider: JAYLIN Dorman     Reason for this admission:   Cellulitis and abscess of right foot with possible early osteomyelitis    Discharge Diagnoses: Active Hospital Problems    Diagnosis Date Noted    IVDU (intravenous drug user) [F19.90] 07/13/2021    Fibromyalgia [M79.7] 07/13/2021    Cellulitis and abscess of foot [L03.119, L02.619] 07/12/2021       Procedures:  MRI FOOT RIGHT WO CONTRAST   Final Result   1. Fluid collection in the dorsum of the foot, superficial to the ulcer, directly overlying the first metatarsal, suspicious for abscess. 2. Very patchy areas of marrow edema in the first metatarsal and first proximal phalanx with corresponding T1 signal hypointensity, which is nonspecific but could reflect early osteomyelitis. 3. Edema in the dorsal aspect of the foot, suggestive of a cellulitis. 4. Intermetatarsal bursitis as above. 5. First MTP joint effusion. CT FOOT RIGHT W WO CONTRAST   Final Result   1. In the medial aspect of the forefoot, overlying the distal first metatarsal there is an area of nonenhancement in the dorsal soft tissues which extends directly to the surface of the first metatarsal. Differential diagnosis includes myonecrosis versus    abscess. 2. Although no definite areas of periosteal bone formation or cortical erosion are seen in the first metatarsal, early osteomyelitis could be present. Early marrow changes related to osteomyelitis would be better assessed with MR imaging. 3. Diffuse thickening of the skin with edema, most prominent dorsally, which could reflect a cellulitis. I&D per ED Dr. Jenny Caceres 7/13 AM    Consults:   PHARMACY TO [facility] Tig[facility] Course:       Active Hospital Problems    Diagnosis Date Noted    IVDU (intravenous drug be involved in this patient's care. If you have any questions or concerns please feel free to contact me at [phone].

## 2021-07-14 NOTE — PROGRESS NOTES
[place] [address] center called and stated they had a bed assignment for patient, she will be going to [facility], and they were getting the room cleaned and ready and will call back to give assignment and number for report.    Danica Adan RN notified

## 2021-07-14 NOTE — PROGRESS NOTES
[address] [place] called back   Patient going to [address] at [facility], Number for report is Kaitlyn Woodson 81 notified at this time

## 2021-07-15 LAB
BUN/CREATININE RATIO: 11 (ref 0–10)
HEP C VIRUS AB: <0.1 (ref 0–0.9)
HGB BLD-MCNC: 11.9 GM/DL (ref 12.3–15.3)
HIV AG/AB: NORMAL
HIV ANTIGEN: NORMAL
HIV-1 ANTIBODY: NORMAL
HIV-2 AB: NORMAL
RED BLOOD COUNT: 3.85 M/UL (ref 4–5.1)
WHITE BLOOD COUNT: 7.7 K/UL (ref 4.5–11)

## 2021-07-16 LAB
BUN/CREATININE RATIO: 10 (ref 0–10)
HGB BLD-MCNC: 11.4 GM/DL (ref 12.3–15.3)
RED BLOOD COUNT: 3.65 M/UL (ref 4–5.1)
WHITE BLOOD COUNT: 6.7 K/UL (ref 4.5–11)

## 2021-07-17 LAB
BLOOD CULTURE, ROUTINE: NORMAL
BUN/CREATININE RATIO: 12 (ref 0–10)
CULTURE, BLOOD 2: NORMAL
GRAM STAIN RESULT: ABNORMAL
HGB BLD-MCNC: 12.5 GM/DL (ref 12.3–15.3)
ORGANISM: ABNORMAL
RED BLOOD COUNT: 3.86 M/UL (ref 4–5.1)
WHITE BLOOD COUNT: 7.5 K/UL (ref 4.5–11)
WOUND/ABSCESS: ABNORMAL

## 2021-07-18 LAB
ANAEROBIC CULTURE: ABNORMAL
BUN/CREATININE RATIO: 13 (ref 0–10)
GRAM STAIN RESULT: ABNORMAL
HGB BLD-MCNC: 13.5 GM/DL (ref 12.3–15.3)
ORGANISM: ABNORMAL
RED BLOOD COUNT: 4.2 M/UL (ref 4–5.1)
WHITE BLOOD COUNT: 7.8 K/UL (ref 4.5–11)
WOUND/ABSCESS: ABNORMAL

## 2021-07-19 LAB
BUN/CREATININE RATIO: 18 (ref 0–10)
HGB BLD-MCNC: 13.3 GM/DL (ref 12.3–15.3)
RED BLOOD COUNT: 4.18 M/UL (ref 4–5.1)
WHITE BLOOD COUNT: 7.6 K/UL (ref 4.5–11)

## 2021-07-20 LAB
BUN/CREATININE RATIO: 19 (ref 0–10)
HGB BLD-MCNC: 13.8 GM/DL (ref 12.3–15.3)
RED BLOOD COUNT: 4.26 M/UL (ref 4–5.1)
WHITE BLOOD COUNT: 8 K/UL (ref 4.5–11)

## 2021-07-22 LAB
BUN/CREATININE RATIO: 24 (ref 0–10)
HGB BLD-MCNC: 13.5 GM/DL (ref 12.3–15.3)
RED BLOOD COUNT: 4.14 M/UL (ref 4–5.1)
WHITE BLOOD COUNT: 7.3 K/UL (ref 4.5–11)

## 2023-08-01 ENCOUNTER — TRANSCRIBE ORDERS (OUTPATIENT)
Dept: ADMINISTRATIVE | Age: 48
End: 2023-08-01

## 2023-08-01 DIAGNOSIS — R59.9 ENLARGED LYMPH NODE: Primary | ICD-10-CM

## 2024-04-19 ENCOUNTER — HOSPITAL ENCOUNTER (EMERGENCY)
Facility: HOSPITAL | Age: 49
Discharge: LEFT BEFORE DISCHARGE | End: 2024-04-19
Attending: FAMILY MEDICINE
Payer: MEDICARE

## 2024-04-19 DIAGNOSIS — Z76.5 DRUG-SEEKING BEHAVIOR: Primary | ICD-10-CM

## 2024-04-19 PROCEDURE — 99281 EMR DPT VST MAYX REQ PHY/QHP: CPT

## 2024-04-19 NOTE — ED TRIAGE NOTES
Pt would not allow Dr. Cooper to complete assessment. Left before assessment from provider or nurse.

## 2024-04-19 NOTE — ED NOTES
Pt came to ED for dose of \"Methadone\". Reports she is not able to get to her methadone clinic appointment today. Dr. Cooper informed pt that he will not be able to prescribe methadone as she has a contract with methadone clinic. Dr. Cooper asked pt if he could assess her and she declined. States  \"I understand, I just need to get to [facility]\". Pt removed b/p cuff and pulse ox before v/s complete and walked out before assessed by provider or nurse.

## 2024-04-19 NOTE — ED TRIAGE NOTES
Dr. Cooper informed pt that he can not refill her Methadone if she has a contract with a methadone clinic. Pt took b/p cuff and pulse ox off before v/s complete and left before triage complete. States \"I understand, I just need to get out of here so I can get to [facility]\".

## 2024-04-19 NOTE — ED PROVIDER NOTES
[facility]  EMERGENCY DEPARTMENT ENCOUNTER        Pt Name: Tigist Recio  MRN: [id number]  Birthdate 1975  Date of evaluation: 4/19/2024  Provider: Elijah Cooper MD  PCP: Peggy Gustafson APRN - NP  Note Started: 10:41 AM EDT 4/19/24    CHIEF COMPLAINT       Chief Complaint   Patient presents with    Medication Refill       HISTORY OF PRESENT ILLNESS: 1 or more Elements     History from : Patient    Limitations to history : None    Tigist Recio is a 49 y.o. female who presents to the emergency department requesting to receive her methadone treatment here.  Patient was just at  received a dose there emergency department.  Still has a wristband on.  Patient does have an appointment today in [place] to meet up with pain management doctor.  But states she cannot get a ride.  Informed the patient with methadone she will need to go to her pain management physician.  Patient states she will just get a ride and go to [facility].  I asked the patient why she cannot get a ride to her pain management doctor states that her stepdaughter will not wake up till later she cannot get a ride to her pain management doctor in [place].  Patient got up left immediately.  Would not let me examine her.    Nursing Notes were all reviewed and agreed with or any disagreements were addressed in the HPI.    REVIEW OF SYSTEMS :      Review of Systems    All systems reviewed and negative except as in HPI/MDM    SURGICAL HISTORY     Past Surgical History:   Procedure Laterality Date    HYSTERECTOMY  04/2010    partial        CURRENTMEDICATIONS       Previous Medications    BUPRENORPHINE-NALOXONE (SUBOXONE) 8-2 MG SUBL SL TABLET    Place 2 tablets under the tongue daily.    FAMOTIDINE (PEPCID) 20 MG TABLET    Take 20 mg by mouth nightly    GABAPENTIN (NEURONTIN) 800 MG TABLET    Take 800 mg by mouth 3 times daily.        ALLERGIES     Patient has no known allergies.    FAMILYHISTORY       Family

## 2024-06-16 ENCOUNTER — APPOINTMENT (OUTPATIENT)
Dept: CT IMAGING | Facility: HOSPITAL | Age: 49
End: 2024-06-16
Attending: HOSPITALIST
Payer: MEDICARE

## 2024-06-16 ENCOUNTER — HOSPITAL ENCOUNTER (EMERGENCY)
Facility: HOSPITAL | Age: 49
Discharge: HOME | End: 2024-06-16
Attending: HOSPITALIST
Payer: MEDICARE

## 2024-06-16 VITALS
HEIGHT: 62 IN | DIASTOLIC BLOOD PRESSURE: 81 MMHG | SYSTOLIC BLOOD PRESSURE: 126 MMHG | HEART RATE: 97 BPM | TEMPERATURE: 98.3 F | OXYGEN SATURATION: 95 % | BODY MASS INDEX: 27.6 KG/M2 | WEIGHT: 150 LBS | RESPIRATION RATE: 18 BRPM

## 2024-06-16 DIAGNOSIS — S22.22XA CLOSED FRACTURE OF BODY OF STERNUM, INITIAL ENCOUNTER: Primary | ICD-10-CM

## 2024-06-16 PROCEDURE — 71250 CT THORAX DX C-: CPT

## 2024-06-16 PROCEDURE — 99284 EMERGENCY DEPT VISIT MOD MDM: CPT

## 2024-06-16 RX ORDER — METHADONE HYDROCHLORIDE 10 MG/5ML
120 SOLUTION ORAL DAILY
COMMUNITY

## 2024-06-16 ASSESSMENT — LIFESTYLE VARIABLES
HOW OFTEN DO YOU HAVE A DRINK CONTAINING ALCOHOL: NEVER
HOW MANY STANDARD DRINKS CONTAINING ALCOHOL DO YOU HAVE ON A TYPICAL DAY: PATIENT DOES NOT DRINK

## 2024-06-16 ASSESSMENT — PAIN SCALES - GENERAL: PAINLEVEL_OUTOF10: 6

## 2024-06-16 ASSESSMENT — PAIN - FUNCTIONAL ASSESSMENT: PAIN_FUNCTIONAL_ASSESSMENT: 0-10

## 2024-06-16 NOTE — ED TRIAGE NOTES
Patient states her  was inpatient at [facility] at the end of May, 2024. She fell asleep in a chair with head on her chest. States when she woke up her head was burning. She states her chest began to hurt and she was seen in [place] ER. States she is still hurting 2+ weeks later and cannot seem to get any relief.

## 2024-06-16 NOTE — ED PROVIDER NOTES
MG SUBL SL TABLET    Place 2 tablets under the tongue daily.    FAMOTIDINE (PEPCID) 20 MG TABLET    Take 20 mg by mouth nightly    GABAPENTIN (NEURONTIN) 800 MG TABLET    Take 800 mg by mouth 3 times daily.               (Please note that portions of this note were completed with a voice recognition program.  Efforts were made to edit the dictations but occasionally words are mis-transcribed.)    Juan Negrete DO (electronically signed)           Juan Negrete DO  06/16/24 5134

## 2025-03-08 ENCOUNTER — HOSPITAL ENCOUNTER (OUTPATIENT)
Facility: HOSPITAL | Age: 50
Discharge: HOME | End: 2025-03-08
Payer: MEDICARE

## 2025-03-08 LAB
EKG ATRIAL RATE: 82 BPM
EKG DIAGNOSIS: NORMAL
EKG P AXIS: 76 DEGREES
EKG P-R INTERVAL: 158 MS
EKG Q-T INTERVAL: 398 MS
EKG QRS DURATION: 88 MS
EKG QTC CALCULATION (BAZETT): 464 MS
EKG R AXIS: 37 DEGREES
EKG T AXIS: 8 DEGREES
EKG VENTRICULAR RATE: 82 BPM

## 2025-03-08 PROCEDURE — 93005 ELECTROCARDIOGRAM TRACING: CPT
